# Patient Record
Sex: MALE | Race: WHITE | NOT HISPANIC OR LATINO | Employment: FULL TIME | URBAN - NONMETROPOLITAN AREA
[De-identification: names, ages, dates, MRNs, and addresses within clinical notes are randomized per-mention and may not be internally consistent; named-entity substitution may affect disease eponyms.]

---

## 2021-04-23 PROCEDURE — U0004 COV-19 TEST NON-CDC HGH THRU: HCPCS | Performed by: PERSONAL EMERGENCY RESPONSE ATTENDANT

## 2023-03-09 ENCOUNTER — HOSPITAL ENCOUNTER (EMERGENCY)
Facility: HOSPITAL | Age: 23
Discharge: HOME | End: 2023-03-09
Attending: STUDENT IN AN ORGANIZED HEALTH CARE EDUCATION/TRAINING PROGRAM | Admitting: STUDENT IN AN ORGANIZED HEALTH CARE EDUCATION/TRAINING PROGRAM
Payer: OTHER MISCELLANEOUS

## 2023-03-09 VITALS
BODY MASS INDEX: 22.35 KG/M2 | OXYGEN SATURATION: 99 % | HEIGHT: 72 IN | DIASTOLIC BLOOD PRESSURE: 91 MMHG | WEIGHT: 165 LBS | SYSTOLIC BLOOD PRESSURE: 130 MMHG | RESPIRATION RATE: 14 BRPM | TEMPERATURE: 97.5 F | HEART RATE: 70 BPM

## 2023-03-09 DIAGNOSIS — S01.412A CHEEK LACERATION, LEFT, INITIAL ENCOUNTER: Primary | ICD-10-CM

## 2023-03-09 PROCEDURE — 99282 EMERGENCY DEPT VISIT SF MDM: CPT

## 2023-03-09 NOTE — DISCHARGE INSTRUCTIONS
Keep area clean and dry. It is ok to take a shower later today. Do not put ointment on the skin glue as it will break down the adhesive. The glue and steri-strips will fall off in a few days as the skin heals. Do not pick at the area as this can damage the surrounding skin.

## 2023-03-09 NOTE — ED PROVIDER NOTES
"Subjective  History of Present Illness:    Patient is a 22-year-old male here today with a laceration on his left cheek.  He states that he was at work and a board was knocked over and it collided with his face.  He did not lose consciousness, no blurry vision and no pain with eye movement.  The area bled excessively in the beginning but now the bleeding has stopped.  He was advised to come to the hospital by his coworkers to receive stitches.  He is up-to-date on his Tdap and is otherwise a healthy male.    Nurses Notes reviewed and agree, including vitals, allergies, social history and prior medical history.     REVIEW OF SYSTEMS: All systems reviewed and not pertinent unless noted.  Review of Systems    History reviewed. No pertinent past medical history.    Allergies:    Patient has no known allergies.      History reviewed. No pertinent surgical history.      Social History     Socioeconomic History   • Marital status: Single   Tobacco Use   • Smoking status: Light Smoker   Substance and Sexual Activity   • Alcohol use: Yes     Alcohol/week: 3.0 standard drinks     Types: 3 Cans of beer per week     Comment: socially         History reviewed. No pertinent family history.    Objective  Physical Exam:  /91 (BP Location: Left arm, Patient Position: Sitting)   Pulse 70   Temp 97.5 °F (36.4 °C) (Oral)   Resp 14   Ht 182.9 cm (72\")   Wt 74.8 kg (165 lb)   SpO2 99%   BMI 22.38 kg/m²      Physical Exam  Vitals and nursing note reviewed.   Constitutional:       Appearance: Normal appearance. He is normal weight.   HENT:      Head: Normocephalic and atraumatic.     Cardiovascular:      Rate and Rhythm: Normal rate.      Pulses: Normal pulses.   Pulmonary:      Effort: Pulmonary effort is normal.   Musculoskeletal:      Right lower leg: No edema.      Left lower leg: No edema.   Skin:     General: Skin is warm and dry.      Capillary Refill: Capillary refill takes less than 2 seconds.   Neurological:      " General: No focal deficit present.      Mental Status: He is alert and oriented to person, place, and time.   Psychiatric:         Mood and Affect: Mood normal.         Behavior: Behavior normal.         Laceration Repair    Date/Time: 3/9/2023 1:59 PM  Performed by: Amadeo Claudio APRN  Authorized by: Jaron Carlos MD     Consent:     Consent obtained:  Verbal    Risks discussed:  Poor cosmetic result  Universal protocol:     Patient identity confirmed:  Verbally with patient and arm band  Anesthesia:     Anesthesia method:  None  Laceration details:     Location:  Face    Face location:  L cheek    Length (cm):  2  Exploration:     Contaminated: no    Treatment:     Area cleansed with:  Saline  Skin repair:     Repair method:  Steri-Strips and tissue adhesive    Number of Steri-Strips:  1  Approximation:     Approximation:  Close  Repair type:     Repair type:  Simple  Post-procedure details:     Dressing:  Open (no dressing)    Procedure completion:  Tolerated        ED Course:         Lab Results (last 24 hours)     ** No results found for the last 24 hours. **           No radiology results from the last 24 hrs       MDM    Initial impression of presenting illness: Left cheek laceration    DDX: includes but is not limited to: Facial laceration    Patient arrives hemodynamically stable with vitals interpreted by myself.     Pertinent features from physical exam: Laceration as described above.    Initial diagnostic plan: No imaging needed    Results from initial plan were reviewed and interpreted by me revealing no testing required    Diagnostic information from other sources: Medical record    Interventions / Re-evaluation: Patient was agreeable to a laceration repair via skin adhesive.  See procedure note above    Consultations/Discussion of results with other physicians: None    Disposition plan: Patient is a 22-year-old male here today with a laceration to his left cheek.  Area closed well with 2  Steri-Strips and skin adhesive.  Advised him on care at home and to not use topical antibiotic ointment on the adhesive as it will cause it to disintegrate.  Patient understands plan of care and is safe for discharge.  -----    Final diagnoses:   Cheek laceration, left, initial encounter        Amadeo Claudio, APRN  03/09/23 [id number]